# Patient Record
Sex: MALE | URBAN - METROPOLITAN AREA
[De-identification: names, ages, dates, MRNs, and addresses within clinical notes are randomized per-mention and may not be internally consistent; named-entity substitution may affect disease eponyms.]

---

## 2022-10-12 ENCOUNTER — ATHLETIC TRAINING SESSION (OUTPATIENT)
Dept: SPORTS MEDICINE | Facility: CLINIC | Age: 22
End: 2022-10-12

## 2022-10-12 NOTE — PROGRESS NOTES
"Date of Evaluation: 10/15/2022    : Ti Nieto      Date of Concussion: 10/11/2022    HPI: Bienvenido Cartagena is being seen in the athletic training room for concussion testing. He is doing well. Improving each day.      Symptom Evaluation  0-6   Headache 1   "Pressure in head" 0   Neck pain  1   Nausea or vomiting 0   Dizziness 1   Blurred vision 0   Balance problems 0   Sensitivity to light 1   Sensitivity to noise  0   Feeling slowed down 0   Feeling like "in a fog" 0   "Don't feel right" 1   Difficulty concentrating 1   Difficulty remembering  1   Fatigue or low energy 0   Confusion  1   Drowsiness 0   More emotional 0   Irritability  0   Sadness 0   Nervous or Anxious 0   Trouble falling asleep 0         Total # of symptoms 8/22   Symptom severity score 8/132       Assessment:  Concussion      Plan:  Symptom check daily. [facility]       Date of Evaluation: 10/14/2022    : Ti Nieto    Date of Concussion: 10/11/2022    HPI: Bienvenido Cartagena is being seen in the athletic training room for concussion testing. He is doing good. He reports feeling better; has been able to attend classes w/out issues.      Symptom Evaluation  0-6   Headache 1   "Pressure in head" 1   Neck pain  1   Nausea or vomiting 0   Dizziness 1   Blurred vision 0   Balance problems 0   Sensitivity to light 1   Sensitivity to noise  0   Feeling slowed down 1   Feeling like "in a fog" 0   "Don't feel right" 1   Difficulty concentrating 1   Difficulty remembering  1   Fatigue or low energy 0   Confusion  1   Drowsiness 1   More emotional 0   Irritability  0   Sadness 0   Nervous or Anxious 0   Trouble falling asleep 0         Total # of symptoms 11/22   Symptom severity score 11/132       Assessment:  Concussion      Plan:  Symptom check daily. [facility]       Date of Evaluation: 10/13/2022    : Ti Nieto    Date of Concussion: 10/11/2022    HPI: Bienvenido Cartagean is being seen in the athletic training room for " "concussion testing. He is doing better. He reports his headache is not as bad as it was yesterday. He said he was able to sleep last night with no issues.       Symptom Evaluation  0-6   Headache 2   "Pressure in head" 1   Neck pain  2   Nausea or vomiting 0   Dizziness 2   Blurred vision 0   Balance problems 0   Sensitivity to light 1   Sensitivity to noise  0   Feeling slowed down 1   Feeling like "in a fog" 0   "Don't feel right" 2   Difficulty concentrating 1   Difficulty remembering  1   Fatigue or low energy 1   Confusion  0   Drowsiness 1   More emotional 0   Irritability  0   Sadness 0   Nervous or Anxious 0   Trouble falling asleep 0         Total # of symptoms 11/22   Symptom severity score 15/132       Assessment:  Concussion      Plan:  Symptom check daily. Will sit in practice this afternoon as long as he can tolerate. Continue to go to class as tolerated. [facility]       Date of Evaluation: 10/12/2022    : Ti Nieto    Bienvenido, a 21 y.o. male is here today for evaluation of a closed head injury. DOI: 10/11/2022.  No LOC from concussion event.     Sustained a fall during basketball practice with a head impact    School / grade: Senior  Sport: Basketball  Position: Guard  Dominant hand: Right    How many concussions have you had in the past? 2  When was your most recent concussion & how long was recovery? High school  Have you ever been hospitalized or had medical imaging done for a head injury? no  Have you ever been diagnosed with headaches or migraines? no  Do you have a learning disability / dyslexia? no  Do you have ADD/ADHD? no  Have you been diagnosed with depression, anxiety or other psychiatric disorder? no  Have you taken a baseline ImPACT examination? Yes     Symptom Evaluation  0-6   Headache 3   "Pressure in head" 3   Neck pain  0   Nausea or vomiting 0   Dizziness 2   Blurred vision 0   Balance problems 0   Sensitivity to light 1   Sensitivity to noise  0   Feeling slowed " "down 2   Feeling like "in a fog" 0   "Don't feel right" 3   Difficulty concentrating 0   Difficulty remembering  3   Fatigue or low energy 3   Confusion  2   Drowsiness 1   More emotional 0   Irritability  0   Sadness 0   Nervous or Anxious 0   Trouble falling asleep 0         Total # of symptoms 10/22   Symptom severity score 23/132       Assessment:  Concussion      Plan:  Rest; tylenol for pain. Symptom check and re-eval daily. [facility]     "

## 2022-10-12 NOTE — PROGRESS NOTES
"Subjective:          Chief Complaint: Bienvenido Cartagena is a 21 y.o. male student at  who had no chief complaint listed for this encounter.    Bienvenido sustained a head injury after a fall yesterday afternoon during practice. He did not finish practice and was evaluated in the athletic training room. He reports no LOC, chief complaint is headache. He was instructed to rest and given tylenol for his headache. He reported to the ATR this morning feeling "ok". He said his headache had gone down. He was administered a symptom check/score sheet where it was determined he had 10 symptoms. He will not practice today and continue to be symptom checked daily. [facility]TC        ROS                Objective:        General: Bienvenido is well-developed, well-nourished, appears stated age, in no acute distress, alert and oriented to time, place and person.     AT Session            Assessment:         Concussion        Plan:         1. Symptom check daily; rest  2. Physician Referral: no  3. ED Referral: no  4. Parent/Guardian Notified: No  5. All questions were answered, ath. will contact me for questions or concerns in  the interim.  6.         Eligible to use School Insurance: Yes                    "

## 2022-10-17 NOTE — PROGRESS NOTES
"Date of Evaluation: 10/22/2022    : Ti Nieto    Date of Concussion: 10/11/2022    HPI: Bienvenido Cartagena is being seen in the athletic training room for concussion return to play progression.  He completed Stage 5 of the progression today without symptom exacerbation.     30 min skill workout w/   Aerobic activity by running/sprinting    Pre-Activity  Symptom Evaluation  0-6   Headache 0   "Pressure in head" 0   Neck pain  0   Nausea or vomiting 0   Dizziness 0   Blurred vision 0   Balance problems 0   Sensitivity to light 0   Sensitivity to noise  0   Feeling slowed down 0   Feeling like "in a fog" 0   "Don't feel right" 0   Difficulty concentrating 0   Difficulty remembering  0   Fatigue or low energy 0   Confusion  0   Drowsiness 0   More emotional 0   Irritability  0   Sadness 0   Nervous or Anxious 0   Trouble falling asleep 0         Total # of symptoms 0/22   Symptom severity score 0/132       Post-Activity  Symptom Evaluation  0-6   Headache 0   "Pressure in head" 0   Neck pain  0   Nausea or vomiting 0   Dizziness 0   Blurred vision 0   Balance problems 0   Sensitivity to light 0   Sensitivity to noise  0   Feeling slowed down 0   Feeling like "in a fog" 0   "Don't feel right" 0   Difficulty concentrating 0   Difficulty remembering  0   Fatigue or low energy 0   Confusion  0   Drowsiness 0   More emotional 0   Irritability  0   Sadness 0   Nervous or Anxious 0   Trouble falling asleep 0         Total # of symptoms 0/22   Symptom severity score 0/132       Rehabilitation Stage Functional Exercise at Each Stage of Rehabilitation Objective of Each Stage   1. No Activity Symptom limited physical and cognitive rest Recovery   2. Light aerobic exercise Walking, swimming, or stationary cycling keeping intensity <70% maximum heart rate for 15-30 minutes Increase HR   3. Sport-specific exercise Skating drills in hockey, running drills in soccer. No head impact activities. Increase heart rate " "to <80% for 45 minutes Add movement   4. Non-contact training drills Progression to more complex training drills, eg, passing drills in football and soccer. Increase heart rate to <90% for 60 minutes. May start progressive resistance training Exercise, coordination, and cognitive load   5. Full contact practice Following medical clearance participate in normal training activities Restore confidence and assess functional skills by coaching staff   6. Return to play Normal game play       Assessment:  Concussion, asymptomatic. Progression through gradual return      Plan:  Return to practice tomorrow if he remains asymptomatic. [facility]        Date of Evaluation: 10/21/2022    : Ti Nieto    Date of Concussion: 10/11/2022    HPI: Bienvenido Cartagena is being seen in the athletic training room for concussion return to play progression.  He completed Stage 4 of the progression today without symptom exacerbation.     Re-tested on IMPACT; he passed and tested similar to his preseason baseline scores.     Increased aerobic activity (sprinting, basketball conditioning)  30 mins of continuous shooting    Pre-Activity  Symptom Evaluation  0-6   Headache 0   "Pressure in head" 0   Neck pain  0   Nausea or vomiting 0   Dizziness 0   Blurred vision 0   Balance problems 0   Sensitivity to light 0   Sensitivity to noise  0   Feeling slowed down 0   Feeling like "in a fog" 0   "Don't feel right" 0   Difficulty concentrating 0   Difficulty remembering  0   Fatigue or low energy 0   Confusion  0   Drowsiness 0   More emotional 0   Irritability  0   Sadness 0   Nervous or Anxious 0   Trouble falling asleep 0         Total # of symptoms 0/22   Symptom severity score 0/132       Post-Activity  Symptom Evaluation  0-6   Headache 0   "Pressure in head" 0   Neck pain  0   Nausea or vomiting 0   Dizziness 0   Blurred vision 0   Balance problems 0   Sensitivity to light 0   Sensitivity to noise  0   Feeling slowed down 0   Feeling " "like "in a fog" 0   "Don't feel right" 0   Difficulty concentrating 0   Difficulty remembering  0   Fatigue or low energy 0   Confusion  0   Drowsiness 0   More emotional 0   Irritability  0   Sadness 0   Nervous or Anxious 0   Trouble falling asleep 0         Total # of symptoms 0/22   Symptom severity score 0/132       Rehabilitation Stage Functional Exercise at Each Stage of Rehabilitation Objective of Each Stage   1. No Activity Symptom limited physical and cognitive rest Recovery   2. Light aerobic exercise Walking, swimming, or stationary cycling keeping intensity <70% maximum heart rate for 15-30 minutes Increase HR   3. Sport-specific exercise Skating drills in hockey, running drills in soccer. No head impact activities. Increase heart rate to <80% for 45 minutes Add movement   4. Non-contact training drills Progression to more complex training drills, eg, passing drills in football and soccer. Increase heart rate to <90% for 60 minutes. May start progressive resistance training Exercise, coordination, and cognitive load   5. Full contact practice Following medical clearance participate in normal training activities Restore confidence and assess functional skills by coaching staff   6. Return to play Normal game play       Assessment:  Concussion, asymptomatic. Progression through gradual return      Plan:  Continue to progress through return to play as his symptoms allow. Plan to do a 30 min individual workout with  tomorrow. [facility]       Date of Evaluation: 10/20/2022    : Ti Nieto    Date of Concussion: 10/11/2022    HPI: Bienvenido Cartagena is being seen in the athletic training room for concussion return to play progression. He completed Stage 3 of the progression today without symptom exacerbation.     Bike  Running/jogging/sprinting    Pre-Activity  Symptom Evaluation  0-6   Headache 0   "Pressure in head" 0   Neck pain  0   Nausea or vomiting 0   Dizziness 0   Blurred vision 0 " "  Balance problems 0   Sensitivity to light 0   Sensitivity to noise  0   Feeling slowed down 0   Feeling like "in a fog" 0   "Don't feel right" 0   Difficulty concentrating 0   Difficulty remembering  0   Fatigue or low energy 0   Confusion  0   Drowsiness 0   More emotional 0   Irritability  0   Sadness 0   Nervous or Anxious 0   Trouble falling asleep 0         Total # of symptoms 0/22   Symptom severity score 0/132       Post-Activity  Symptom Evaluation  0-6   Headache 0   "Pressure in head" 0   Neck pain  0   Nausea or vomiting 0   Dizziness 0   Blurred vision 0   Balance problems 0   Sensitivity to light 0   Sensitivity to noise  0   Feeling slowed down 0   Feeling like "in a fog" 0   "Don't feel right" 0   Difficulty concentrating 0   Difficulty remembering  0   Fatigue or low energy 0   Confusion  0   Drowsiness 0   More emotional 0   Irritability  0   Sadness 0   Nervous or Anxious 0   Trouble falling asleep 0         Total # of symptoms 0/22   Symptom severity score 0/132       Rehabilitation Stage Functional Exercise at Each Stage of Rehabilitation Objective of Each Stage   1. No Activity Symptom limited physical and cognitive rest Recovery   2. Light aerobic exercise Walking, swimming, or stationary cycling keeping intensity <70% maximum heart rate for 15-30 minutes Increase HR   3. Sport-specific exercise Skating drills in hockey, running drills in soccer. No head impact activities. Increase heart rate to <80% for 45 minutes Add movement   4. Non-contact training drills Progression to more complex training drills, eg, passing drills in football and soccer. Increase heart rate to <90% for 60 minutes. May start progressive resistance training Exercise, coordination, and cognitive load   5. Full contact practice Following medical clearance participate in normal training activities Restore confidence and assess functional skills by coaching staff   6. Return to play Normal game play     " "  Assessment:  Concussion, asymptomatic. Progression through gradual return      Plan:  Continue to progress through return as his symptoms dictate. [facility]       Date of Evaluation: 10/19/2022    : Ti Nieto    Date of Concussion: 10/11/2022    HPI: Bienvenido Cartagena is being seen in the athletic training room for concussion return to play progression. He completed Stage 2 of the progression today without symptom exacerbation.     Light bike for 20 mins  Rest 5 mins  Light bike for 10 mins    Pre-Activity  Symptom Evaluation  0-6   Headache 0   "Pressure in head" 0   Neck pain  0   Nausea or vomiting 0   Dizziness 0   Blurred vision 0   Balance problems 0   Sensitivity to light 0   Sensitivity to noise  0   Feeling slowed down 0   Feeling like "in a fog" 0   "Don't feel right" 0   Difficulty concentrating 0   Difficulty remembering  0   Fatigue or low energy 0   Confusion  0   Drowsiness 0   More emotional 0   Irritability  0   Sadness 0   Nervous or Anxious 0   Trouble falling asleep 0         Total # of symptoms 0/22   Symptom severity score 0/132       Post-Activity  Symptom Evaluation  0-6   Headache 0   "Pressure in head" 0   Neck pain  0   Nausea or vomiting 0   Dizziness 0   Blurred vision 0   Balance problems 0   Sensitivity to light 0   Sensitivity to noise  0   Feeling slowed down 0   Feeling like "in a fog" 0   "Don't feel right" 0   Difficulty concentrating 0   Difficulty remembering  0   Fatigue or low energy 0   Confusion  0   Drowsiness 0   More emotional 0   Irritability  0   Sadness 0   Nervous or Anxious 0   Trouble falling asleep 0         Total # of symptoms 0/22   Symptom severity score 0/132       Rehabilitation Stage Functional Exercise at Each Stage of Rehabilitation Objective of Each Stage   1. No Activity Symptom limited physical and cognitive rest Recovery   2. Light aerobic exercise Walking, swimming, or stationary cycling keeping intensity <70% maximum heart rate for 15-30 " "minutes Increase HR   3. Sport-specific exercise Skating drills in hockey, running drills in soccer. No head impact activities. Increase heart rate to <80% for 45 minutes Add movement   4. Non-contact training drills Progression to more complex training drills, eg, passing drills in football and soccer. Increase heart rate to <90% for 60 minutes. May start progressive resistance training Exercise, coordination, and cognitive load   5. Full contact practice Following medical clearance participate in normal training activities Restore confidence and assess functional skills by coaching staff   6. Return to play Normal game play       Assessment:  Concussion, asymptomatic. Progression through gradual return      Plan:  Progress through RTP as his symptoms allow. [facility] [place]       Date of Evaluation: 10/18/2022    : Ti Nieto    Date of Concussion: 10/11/2022    HPI: Bienvenido Cartagena is being seen in the athletic training room for concussion testing. He is doing much better. He is back to his normal personality and looks much improved.       Symptom Evaluation  0-6   Headache 1   "Pressure in head" 0   Neck pain  0   Nausea or vomiting 0   Dizziness 0   Blurred vision 0   Balance problems 0   Sensitivity to light 0   Sensitivity to noise  0   Feeling slowed down 0   Feeling like "in a fog" 0   "Don't feel right" 0   Difficulty concentrating 0   Difficulty remembering  1   Fatigue or low energy 0   Confusion  0   Drowsiness 0   More emotional 0   Irritability  0   Sadness 0   Nervous or Anxious 0   Trouble falling asleep 0         Total # of symptoms 2/22   Symptom severity score 2/132       Assessment:  Concussion      Plan:  Symptom check daily. He is progressing close to his pre-season baseline. Will evaluate tomorrow for status on beginning RTP. [facility] [place]        Date of Evaluation: 10/17/2022    : Ti Nieto    Date of Concussion: 10/11/2022    HPI: Bienvenido Cartagena is being seen in the athletic " "training room for concussion testing. He is doing good. Continues to feel better each day.      Symptom Evaluation  0-6   Headache 0   "Pressure in head" 0   Neck pain  0   Nausea or vomiting 0   Dizziness 1   Blurred vision 0   Balance problems 0   Sensitivity to light 0   Sensitivity to noise  0   Feeling slowed down 0   Feeling like "in a fog" 0   "Don't feel right" 0   Difficulty concentrating 1   Difficulty remembering  1   Fatigue or low energy 0   Confusion  0   Drowsiness 0   More emotional 0   Irritability  0   Sadness 0   Nervous or Anxious 0   Trouble falling asleep 0         Total # of symptoms 3/22   Symptom severity score 3/132       Assessment:  Concussion      Plan:  Symptom check daily. Has continued to go to class without complaints. [facility]        Date of Evaluation: 10/16/2022    : Ti Nieto    Date of Concussion: 10/11/2022    HPI: Bienvenido Cartagena is being seen in the athletic training room for concussion testing. He is doing well. Reports feeling good and improving each day.       Symptom Evaluation  0-6   Headache 1   "Pressure in head" 0   Neck pain  0   Nausea or vomiting 0   Dizziness 1   Blurred vision 0   Balance problems 0   Sensitivity to light 0   Sensitivity to noise  0   Feeling slowed down 0   Feeling like "in a fog" 0   "Don't feel right" 1   Difficulty concentrating 1   Difficulty remembering  1   Fatigue or low energy 0   Confusion  0   Drowsiness 0   More emotional 0   Irritability  0   Sadness 0   Nervous or Anxious 0   Trouble falling asleep 0         Total # of symptoms 5/22   Symptom severity score 5/132       Assessment:  Concussion      Plan:  Symptom check daily. [facility]      "

## 2022-10-23 NOTE — PROGRESS NOTES
"Date of Evaluation: 10/23/2022    : Ti Nieto    Date of Concussion: 10/11/2022    HPI: Bienvenido Cartagena is being seen in the athletic training room for concussion return to play progression.  He completed Stage 6 of the progression today without symptom exacerbation.     Full practice    Pre-Activity  Symptom Evaluation  0-6   Headache 0   "Pressure in head" 0   Neck pain  0   Nausea or vomiting 0   Dizziness 0   Blurred vision 0   Balance problems 0   Sensitivity to light 0   Sensitivity to noise  0   Feeling slowed down 0   Feeling like "in a fog" 0   "Don't feel right" 0   Difficulty concentrating 0   Difficulty remembering  0   Fatigue or low energy 0   Confusion  0   Drowsiness 0   More emotional 0   Irritability  0   Sadness 0   Nervous or Anxious 0   Trouble falling asleep 0         Total # of symptoms 0/22   Symptom severity score 0/132         Rehabilitation Stage Functional Exercise at Each Stage of Rehabilitation Objective of Each Stage   1. No Activity Symptom limited physical and cognitive rest Recovery   2. Light aerobic exercise Walking, swimming, or stationary cycling keeping intensity <70% maximum heart rate for 15-30 minutes Increase HR   3. Sport-specific exercise Skating drills in hockey, running drills in soccer. No head impact activities. Increase heart rate to <80% for 45 minutes Add movement   4. Non-contact training drills Progression to more complex training drills, eg, passing drills in football and soccer. Increase heart rate to <90% for 60 minutes. May start progressive resistance training Exercise, coordination, and cognitive load   5. Full contact practice Following medical clearance participate in normal training activities Restore confidence and assess functional skills by coaching staff   6. Return to play Normal game play       Assessment:  Concussion, asymptomatic. Progression through gradual return      Plan:  Athlete has completed the concussion return to play " progression and is cleared to return to full participation. [facility]